# Patient Record
(demographics unavailable — no encounter records)

---

## 2025-04-10 NOTE — HISTORY OF PRESENT ILLNESS
[FreeTextEntry1] : Annual PE hair loss, el lft, el chol, dep/anx, back pain. [de-identified] : Overdue for f/u.  26 y/o hx el lft and el chol - okay on repeat check, depression and anx Here for annual PE and f/u. had sl inc Cr.  Better and nl on repeat.   Hair has been okay, no longer taking finasteride Back has been okay. much better - just a little stiff.  Has not called to go to PT.  Has followed up with ortho.  Reports that it has been okay.   Has been having some gas. Reprots few episodes of blood in the stool with straining. on the outside. no noted pain. + occ pruritus. no other GI sx. no noted diarrhea or constipation.   continues to occ get.   no issues with depression or anx. no anhedonia. Had seen psych. No longer going to therapy.  has been w/o sx. Has been as good with the diet and exercise - lipids were improved at last check no neck sx Has seen ENT since last visit. REprots pain at the posteromedial elbows.  Started after workout.  Improved with rest, but returned with starting agin.  has continued to bother him,  gets flu vaccine  tdap - 2024.

## 2025-04-10 NOTE — PHYSICAL EXAM
[No Acute Distress] : no acute distress [Well Nourished] : well nourished [Well Developed] : well developed [Well-Appearing] : well-appearing [Normal Voice/Communication] : normal voice/communication [Normal Sclera/Conjunctiva] : normal sclera/conjunctiva [PERRL] : pupils equal round and reactive to light [EOMI] : extraocular movements intact [Normal Outer Ear/Nose] : the outer ears and nose were normal in appearance [Normal Oropharynx] : the oropharynx was normal [Normal TMs] : both tympanic membranes were normal [No JVD] : no jugular venous distention [No Lymphadenopathy] : no lymphadenopathy [Supple] : supple [Thyroid Normal, No Nodules] : the thyroid was normal and there were no nodules present [No Respiratory Distress] : no respiratory distress  [No Accessory Muscle Use] : no accessory muscle use [Clear to Auscultation] : lungs were clear to auscultation bilaterally [Normal Rate] : normal rate  [Regular Rhythm] : with a regular rhythm [Normal S1, S2] : normal S1 and S2 [No Murmur] : no murmur heard [No Carotid Bruits] : no carotid bruits [No Abdominal Bruit] : a ~M bruit was not heard ~T in the abdomen [Pedal Pulses Present] : the pedal pulses are present [No Edema] : there was no peripheral edema [Soft] : abdomen soft [Non Tender] : non-tender [Non-distended] : non-distended [No Masses] : no abdominal mass palpated [No HSM] : no HSM [Normal Bowel Sounds] : normal bowel sounds [Normal Posterior Cervical Nodes] : no posterior cervical lymphadenopathy [Normal Anterior Cervical Nodes] : no anterior cervical lymphadenopathy [No CVA Tenderness] : no CVA  tenderness [No Spinal Tenderness] : no spinal tenderness [No Joint Swelling] : no joint swelling [Grossly Normal Strength/Tone] : grossly normal strength/tone [Coordination Grossly Intact] : coordination grossly intact [No Focal Deficits] : no focal deficits [Normal Gait] : normal gait [Speech Grossly Normal] : speech grossly normal [Memory Grossly Normal] : memory grossly normal [Normal Affect] : the affect was normal [Alert and Oriented x3] : oriented to person, place, and time [Normal Mood] : the mood was normal [Normal Insight/Judgement] : insight and judgment were intact [de-identified] : ++ tenderness at the medial epicondyle at the elbows

## 2025-04-10 NOTE — HEALTH RISK ASSESSMENT
[No] : In the past 12 months have you used drugs other than those required for medical reasons? No [No falls in past year] : Patient reported no falls in the past year [0] : 2) Feeling down, depressed, or hopeless: Not at all (0) [PHQ-2 Negative - No further assessment needed] : PHQ-2 Negative - No further assessment needed [Never] : Never [NO] : No [None] : None [With Family] : lives with family [Student] : student [Single] : single [Feels Safe at Home] : Feels safe at home [Fully functional (bathing, dressing, toileting, transferring, walking, feeding)] : Fully functional (bathing, dressing, toileting, transferring, walking, feeding) [Fully functional (using the telephone, shopping, preparing meals, housekeeping, doing laundry, using] : Fully functional and needs no help or supervision to perform IADLs (using the telephone, shopping, preparing meals, housekeeping, doing laundry, using transportation, managing medications and managing finances) [Smoke Detector] : smoke detector [Carbon Monoxide Detector] : carbon monoxide detector [Seat Belt] :  uses seat belt [Sunscreen] : uses sunscreen [Audit-CScore] : 0 [[id number]] : 0 [Change in mental status noted] : No change in mental status noted [Reports changes in hearing] : Reports no changes in hearing [Reports changes in vision] : Reports no changes in vision [Reports changes in dental health] : Reports no changes in dental health

## 2025-04-10 NOTE — REVIEW OF SYSTEMS
[Joint Pain] : joint pain [Negative] : Heme/Lymph [Chest Pain] : no chest pain [Palpitations] : no palpitations [Claudication] : no  leg claudication [Lower Ext Edema] : no lower extremity edema [Orthopena] : no orthopnea [Paroxysmal Nocturnal Dyspnea] : no paroxysmal nocturnal dyspnea [Shortness Of Breath] : no shortness of breath [Wheezing] : no wheezing [Cough] : no cough [Dyspnea on Exertion] : not dyspnea on exertion [Abdominal Pain] : no abdominal pain [Nausea] : no nausea [Constipation] : no constipation [Diarrhea] : no diarrhea [Vomiting] : no vomiting [Heartburn] : no heartburn [Dysuria] : no dysuria [Incontinence] : no incontinence [Hesitancy] : no hesitancy [Hematuria] : no hematuria [Frequency] : no frequency [Joint Stiffness] : no joint stiffness [Muscle Pain] : no muscle pain [Muscle Weakness] : no muscle weakness [Back Pain] : no back pain [Joint Swelling] : no joint swelling

## 2025-04-10 NOTE — HEALTH RISK ASSESSMENT
[No] : In the past 12 months have you used drugs other than those required for medical reasons? No [No falls in past year] : Patient reported no falls in the past year [0] : 2) Feeling down, depressed, or hopeless: Not at all (0) [PHQ-2 Negative - No further assessment needed] : PHQ-2 Negative - No further assessment needed [Never] : Never [NO] : No [None] : None [With Family] : lives with family [Student] : student [Single] : single [Feels Safe at Home] : Feels safe at home [Fully functional (bathing, dressing, toileting, transferring, walking, feeding)] : Fully functional (bathing, dressing, toileting, transferring, walking, feeding) [Fully functional (using the telephone, shopping, preparing meals, housekeeping, doing laundry, using] : Fully functional and needs no help or supervision to perform IADLs (using the telephone, shopping, preparing meals, housekeeping, doing laundry, using transportation, managing medications and managing finances) [Smoke Detector] : smoke detector [Carbon Monoxide Detector] : carbon monoxide detector [Seat Belt] :  uses seat belt [Sunscreen] : uses sunscreen [Audit-CScore] : 0 [[id number]] : 0 [Change in mental status noted] : No change in mental status noted [Reports changes in hearing] : Reports no changes in hearing [Reports changes in vision] : Reports no changes in vision [Reports changes in dental health] : Reports no changes in dental health

## 2025-04-10 NOTE — HISTORY OF PRESENT ILLNESS
[FreeTextEntry1] : Annual PE hair loss, el lft, el chol, dep/anx, back pain. [de-identified] : Overdue for f/u.  26 y/o hx el lft and el chol - okay on repeat check, depression and anx Here for annual PE and f/u. had sl inc Cr.  Better and nl on repeat.   Hair has been okay, no longer taking finasteride Back has been okay. much better - just a little stiff.  Has not called to go to PT.  Has followed up with ortho.  Reports that it has been okay.   Has been having some gas. Reprots few episodes of blood in the stool with straining. on the outside. no noted pain. + occ pruritus. no other GI sx. no noted diarrhea or constipation.   continues to occ get.   no issues with depression or anx. no anhedonia. Had seen psych. No longer going to therapy.  has been w/o sx. Has been as good with the diet and exercise - lipids were improved at last check no neck sx Has seen ENT since last visit. REprots pain at the posteromedial elbows.  Started after workout.  Improved with rest, but returned with starting agin.  has continued to bother him,  gets flu vaccine  tdap - 2024.

## 2025-04-10 NOTE — PHYSICAL EXAM
[No Acute Distress] : no acute distress [Well Nourished] : well nourished [Well Developed] : well developed [Well-Appearing] : well-appearing [Normal Voice/Communication] : normal voice/communication [Normal Sclera/Conjunctiva] : normal sclera/conjunctiva [PERRL] : pupils equal round and reactive to light [EOMI] : extraocular movements intact [Normal Outer Ear/Nose] : the outer ears and nose were normal in appearance [Normal Oropharynx] : the oropharynx was normal [Normal TMs] : both tympanic membranes were normal [No JVD] : no jugular venous distention [No Lymphadenopathy] : no lymphadenopathy [Supple] : supple [Thyroid Normal, No Nodules] : the thyroid was normal and there were no nodules present [No Respiratory Distress] : no respiratory distress  [No Accessory Muscle Use] : no accessory muscle use [Clear to Auscultation] : lungs were clear to auscultation bilaterally [Normal Rate] : normal rate  [Regular Rhythm] : with a regular rhythm [Normal S1, S2] : normal S1 and S2 [No Murmur] : no murmur heard [No Carotid Bruits] : no carotid bruits [No Abdominal Bruit] : a ~M bruit was not heard ~T in the abdomen [Pedal Pulses Present] : the pedal pulses are present [No Edema] : there was no peripheral edema [Soft] : abdomen soft [Non Tender] : non-tender [Non-distended] : non-distended [No Masses] : no abdominal mass palpated [No HSM] : no HSM [Normal Bowel Sounds] : normal bowel sounds [Normal Posterior Cervical Nodes] : no posterior cervical lymphadenopathy [Normal Anterior Cervical Nodes] : no anterior cervical lymphadenopathy [No CVA Tenderness] : no CVA  tenderness [No Spinal Tenderness] : no spinal tenderness [No Joint Swelling] : no joint swelling [Grossly Normal Strength/Tone] : grossly normal strength/tone [Coordination Grossly Intact] : coordination grossly intact [No Focal Deficits] : no focal deficits [Normal Gait] : normal gait [Speech Grossly Normal] : speech grossly normal [Memory Grossly Normal] : memory grossly normal [Normal Affect] : the affect was normal [Alert and Oriented x3] : oriented to person, place, and time [Normal Mood] : the mood was normal [Normal Insight/Judgement] : insight and judgment were intact [de-identified] : ++ tenderness at the medial epicondyle at the elbows